# Patient Record
Sex: FEMALE | Race: WHITE | URBAN - METROPOLITAN AREA
[De-identification: names, ages, dates, MRNs, and addresses within clinical notes are randomized per-mention and may not be internally consistent; named-entity substitution may affect disease eponyms.]

---

## 2017-12-07 ENCOUNTER — OFFICE VISIT (OUTPATIENT)
Dept: FAMILY MEDICINE CLINIC | Age: 37
End: 2017-12-07

## 2017-12-07 VITALS
HEART RATE: 88 BPM | SYSTOLIC BLOOD PRESSURE: 100 MMHG | HEIGHT: 62 IN | BODY MASS INDEX: 19.88 KG/M2 | DIASTOLIC BLOOD PRESSURE: 60 MMHG | WEIGHT: 108 LBS

## 2017-12-07 DIAGNOSIS — K52.9 AGE (ACUTE GASTROENTERITIS): ICD-10-CM

## 2017-12-07 DIAGNOSIS — J40 BRONCHITIS: Primary | ICD-10-CM

## 2017-12-07 PROCEDURE — 99213 OFFICE O/P EST LOW 20 MIN: CPT | Performed by: FAMILY MEDICINE

## 2017-12-07 RX ORDER — PREDNISONE 10 MG/1
10 TABLET ORAL 2 TIMES DAILY
Qty: 10 TABLET | Refills: 0 | Status: SHIPPED | OUTPATIENT
Start: 2017-12-07 | End: 2017-12-12

## 2017-12-07 RX ORDER — AZITHROMYCIN 250 MG/1
TABLET, FILM COATED ORAL
Qty: 1 PACKET | Refills: 0 | Status: SHIPPED | OUTPATIENT
Start: 2017-12-07 | End: 2017-12-17

## 2017-12-07 ASSESSMENT — PATIENT HEALTH QUESTIONNAIRE - PHQ9
SUM OF ALL RESPONSES TO PHQ9 QUESTIONS 1 & 2: 0
1. LITTLE INTEREST OR PLEASURE IN DOING THINGS: 0
SUM OF ALL RESPONSES TO PHQ QUESTIONS 1-9: 0
2. FEELING DOWN, DEPRESSED OR HOPELESS: 0

## 2017-12-07 NOTE — PROGRESS NOTES
Chief Complaint   Patient presents with    URI       HPI / ROS: Maritza Chapman presents for evaluation and management of :    # cough she has been off work all week for age much better just taking phenergan. No fever. Not wheezing. MNo ear pain. Mild sore throat. Patient's allergies, past family, medical, surgical, and social history, Rx and OTC meds are reviewed as part of today's visit and Marked as Reviewed. Objective   Wt Readings from Last 3 Encounters:   12/07/17 108 lb (49 kg)   12/02/17 105 lb (47.6 kg)   08/18/14 97 lb (44 kg)       A&O  /60   Pulse 88   Ht 5' 2\" (1.575 m)   Wt 108 lb (49 kg)   BMI 19.75 kg/m²   Skin no rash no jaundice  Neck no TMG no LAD  Car reg no MGR  Lungs coarse wheeze bases  abd benign soft somewhat stooly  Ext no pitting edema  Psych: Judgement and insight are intact, no pressured speech; no psychomotor retardation or agitation; affect and mood congruent    1. Bronchitis      z-pack prednisone call INB   2. AGE (acute gastroenteritis)      resolbed finsih phenergan PRN     No orders of the defined types were placed in this encounter.

## 2017-12-07 NOTE — LETTER
[address], [place] [address] [address]  Phone: [phone]  Fax: [phone]    Dameon Anglin MD        December 7, 2017    [place] [id number] [facility] [id number]    Please excuse 04-08 DEC due to illness. Return to work full duties Monday 11 [address] [facility] [address] 2017.           Dameon Anglin MD